# Patient Record
Sex: FEMALE | URBAN - METROPOLITAN AREA
[De-identification: names, ages, dates, MRNs, and addresses within clinical notes are randomized per-mention and may not be internally consistent; named-entity substitution may affect disease eponyms.]

---

## 2021-11-17 ENCOUNTER — OFFICE VISIT (OUTPATIENT)
Dept: URBAN - METROPOLITAN AREA CLINIC 56 | Facility: CLINIC | Age: 31
End: 2021-11-17
Payer: COMMERCIAL

## 2021-11-17 DIAGNOSIS — H53.9 UNSPECIFIED VISUAL DISTURBANCE: Primary | ICD-10-CM

## 2021-11-17 PROCEDURE — 92134 CPTRZ OPH DX IMG PST SGM RTA: CPT | Performed by: OPTOMETRIST

## 2021-11-17 PROCEDURE — 92004 COMPRE OPH EXAM NEW PT 1/>: CPT | Performed by: OPTOMETRIST

## 2021-11-17 PROCEDURE — 92083 EXTENDED VISUAL FIELD XM: CPT | Performed by: OPTOMETRIST

## 2021-11-17 ASSESSMENT — KERATOMETRY
OD: 43.42
OS: 43.16

## 2021-11-17 ASSESSMENT — INTRAOCULAR PRESSURE
OS: 18
OD: 16

## 2021-11-17 ASSESSMENT — VISUAL ACUITY
OS: 20/20
OD: 20/20

## 2021-11-17 NOTE — IMPRESSION/PLAN
Impression: Unspecified visual disturbance: H53.9. Plan: Patient presents with light sensitivity and intermittent floaters. Uncorrected acuity is 20/20 Right and Left, normal pupillary reflexes and motility. Normal intraocular pressures in both eyes. Clear optical media, normal optic nerves and retinal vasculature with full threshold visual fields in both eyes. There is no ocular etiology for the patient's presenting symptoms.  

Recommend an annual compressive exam.